# Patient Record
Sex: FEMALE | URBAN - METROPOLITAN AREA
[De-identification: names, ages, dates, MRNs, and addresses within clinical notes are randomized per-mention and may not be internally consistent; named-entity substitution may affect disease eponyms.]

---

## 2018-07-24 ENCOUNTER — OFFICE VISIT (OUTPATIENT)
Dept: URBAN - METROPOLITAN AREA CLINIC 88 | Facility: CLINIC | Age: 68
End: 2018-07-24
Payer: MEDICARE

## 2018-07-24 DIAGNOSIS — H25.813 COMBINED FORMS OF AGE-RELATED CATARACT, BILATERAL: ICD-10-CM

## 2018-07-24 DIAGNOSIS — H53.2 DIPLOPIA: ICD-10-CM

## 2018-07-24 DIAGNOSIS — E11.9 TYPE 2 DIABETES MELLITUS WITHOUT COMPLICATIONS: Primary | ICD-10-CM

## 2018-07-24 DIAGNOSIS — H40.1131 PRIMARY OPEN-ANGLE GLAUCOMA, BILATERAL, MILD STAGE: ICD-10-CM

## 2018-07-24 DIAGNOSIS — H50.10 EXOTROPIA: ICD-10-CM

## 2018-07-24 PROCEDURE — 92004 COMPRE OPH EXAM NEW PT 1/>: CPT | Performed by: OPHTHALMOLOGY

## 2018-07-24 RX ORDER — PREDNISOLONE ACETATE 10 MG/ML
1 % SUSPENSION/ DROPS OPHTHALMIC
Qty: 1 | Refills: 1 | Status: INACTIVE
Start: 2018-07-24 | End: 2018-10-18

## 2018-07-24 RX ORDER — LATANOPROST 50 UG/ML
0.005 % SOLUTION OPHTHALMIC
Qty: 0 | Refills: 0 | Status: INACTIVE
Start: 2018-07-24 | End: 2018-10-18

## 2018-07-24 RX ORDER — GATIFLOXACIN 5 MG/ML
0.5 % SOLUTION/ DROPS OPHTHALMIC
Qty: 1 | Refills: 0 | Status: INACTIVE
Start: 2018-07-24 | End: 2018-10-18

## 2018-07-24 RX ORDER — KETOROLAC TROMETHAMINE 5 MG/ML
0.5 % SOLUTION/ DROPS OPHTHALMIC
Qty: 1 | Refills: 1 | Status: INACTIVE
Start: 2018-07-24 | End: 2018-10-18

## 2018-07-24 ASSESSMENT — INTRAOCULAR PRESSURE
OD: 14
OS: 14

## 2018-07-24 ASSESSMENT — KERATOMETRY
OS: 42.75
OD: 42.50

## 2018-07-24 ASSESSMENT — VISUAL ACUITY
OD: 20/50
OS: 20/50

## 2018-07-24 NOTE — IMPRESSION/PLAN
Impression: Diplopia: H53.2. Plan: I explained to the patient that if Diplopia becomes constant she may need glasses.  Recommend Cataract sx first.

## 2018-07-24 NOTE — IMPRESSION/PLAN
Impression: Type 2 diabetes mellitus without complications: N66.8. Plan: Diabetes type II: no background retinopathy, no signs of neovascularization noted. Discussed ocular and systemic benefits of blood sugar control.

## 2018-07-24 NOTE — IMPRESSION/PLAN
Impression: Combined forms of age-related cataract, bilateral: H25.813. Plan: Cataracts account for the patient's complaints. Discussed all risks, benefits, procedures and recovery. Patient understands changing glasses will not improve vision. Patient desires to have surgery, recommend phacoemulsification with intraocular lens. Surgical risks and benefits were discussed, explained and to patient. Schedule Cataract sx OD first/then OS.  RL2  target: distance

## 2018-07-24 NOTE — IMPRESSION/PLAN
Impression: Exotropia: H50.10. Plan: Explained to the patient she may need to see [facility] in the future.

## 2018-08-16 ENCOUNTER — Encounter (OUTPATIENT)
Dept: URBAN - METROPOLITAN AREA CLINIC 88 | Facility: CLINIC | Age: 68
End: 2018-08-16
Payer: MEDICARE

## 2018-08-17 ENCOUNTER — POST-OPERATIVE VISIT (OUTPATIENT)
Dept: URBAN - METROPOLITAN AREA CLINIC 88 | Facility: CLINIC | Age: 68
End: 2018-08-17

## 2018-08-17 PROCEDURE — 99024 POSTOP FOLLOW-UP VISIT: CPT | Performed by: OPTOMETRIST

## 2018-08-17 RX ORDER — BRIMONIDINE TARTRATE 2 MG/ML
0.2 % SOLUTION/ DROPS OPHTHALMIC
Qty: 1 | Refills: 0 | Status: INACTIVE
Start: 2018-08-17 | End: 2018-10-18

## 2018-08-17 ASSESSMENT — INTRAOCULAR PRESSURE: OD: 18

## 2018-08-24 ENCOUNTER — POST-OPERATIVE VISIT (OUTPATIENT)
Dept: URBAN - METROPOLITAN AREA CLINIC 88 | Facility: CLINIC | Age: 68
End: 2018-08-24

## 2018-08-24 PROCEDURE — 99024 POSTOP FOLLOW-UP VISIT: CPT | Performed by: OPTOMETRIST

## 2018-08-24 ASSESSMENT — INTRAOCULAR PRESSURE
OD: 17
OS: 17

## 2018-08-24 ASSESSMENT — VISUAL ACUITY: OD: 20/25

## 2018-09-14 ENCOUNTER — Encounter (OUTPATIENT)
Dept: URBAN - METROPOLITAN AREA EXTERNAL CLINIC 48 | Facility: EXTERNAL CLINIC | Age: 68
End: 2018-09-14
Payer: MEDICARE

## 2018-09-14 ENCOUNTER — POST-OPERATIVE VISIT (OUTPATIENT)
Dept: URBAN - METROPOLITAN AREA CLINIC 88 | Facility: CLINIC | Age: 68
End: 2018-09-14

## 2018-09-14 DIAGNOSIS — Z09 ENCNTR FOR F/U EXAM AFT TRTMT FOR COND OTH THAN MALIG NEOPLM: Primary | ICD-10-CM

## 2018-09-14 PROCEDURE — 99024 POSTOP FOLLOW-UP VISIT: CPT | Performed by: OPHTHALMOLOGY

## 2018-09-14 PROCEDURE — 66984 XCAPSL CTRC RMVL W/O ECP: CPT | Performed by: OPHTHALMOLOGY

## 2018-09-14 ASSESSMENT — INTRAOCULAR PRESSURE
OS: 32
OS: 32

## 2018-09-20 ENCOUNTER — POST-OPERATIVE VISIT (OUTPATIENT)
Dept: URBAN - METROPOLITAN AREA CLINIC 88 | Facility: CLINIC | Age: 68
End: 2018-09-20

## 2018-09-20 PROCEDURE — 99024 POSTOP FOLLOW-UP VISIT: CPT | Performed by: OPTOMETRIST

## 2018-09-20 ASSESSMENT — VISUAL ACUITY
OS: 20/40
OD: 20/30

## 2018-09-20 ASSESSMENT — INTRAOCULAR PRESSURE
OD: 14
OS: 14

## 2018-10-18 ENCOUNTER — POST-OPERATIVE VISIT (OUTPATIENT)
Dept: URBAN - METROPOLITAN AREA CLINIC 88 | Facility: CLINIC | Age: 68
End: 2018-10-18

## 2018-10-18 PROCEDURE — 99024 POSTOP FOLLOW-UP VISIT: CPT | Performed by: OPTOMETRIST

## 2018-10-18 RX ORDER — LATANOPROST 50 UG/ML
0.005 % SOLUTION OPHTHALMIC
Qty: 3 | Refills: 3 | Status: INACTIVE
Start: 2018-10-18 | End: 2019-02-12

## 2018-10-18 ASSESSMENT — INTRAOCULAR PRESSURE
OD: 14
OS: 16

## 2018-10-18 ASSESSMENT — VISUAL ACUITY
OS: 20/60
OD: 20/50

## 2018-11-08 ENCOUNTER — OFFICE VISIT (OUTPATIENT)
Dept: URBAN - METROPOLITAN AREA CLINIC 88 | Facility: CLINIC | Age: 68
End: 2018-11-08
Payer: MEDICARE

## 2018-11-08 DIAGNOSIS — H40.1133 PRIMARY OPEN-ANGLE GLAUCOMA, BILATERAL, SEVERE STAGE: Primary | ICD-10-CM

## 2018-11-08 DIAGNOSIS — Z96.1 PRESENCE OF PSEUDOPHAKIA: ICD-10-CM

## 2018-11-08 PROCEDURE — 99213 OFFICE O/P EST LOW 20 MIN: CPT | Performed by: OPTOMETRIST

## 2018-11-08 ASSESSMENT — INTRAOCULAR PRESSURE
OS: 16
OS: 15
OD: 15

## 2018-11-08 NOTE — IMPRESSION/PLAN
Impression: Primary open-angle glaucoma, bilateral, severe stage: R96.8158. Plan: Discussed status of examination with patient. Treatment was started by Dr. Charyl Leyden. Pt wishes to continue care at [facility]. Continue Latanoprost QHS OU. OCT ON today, thin inferior OU average thickness 68 OD, 64 OS. VF 24-2 today superior arcuate paracentral loss, severe stage OU. IOP today 15 OU, will consider additional medications. Will set target IOP after receiving medical records from Dr. Charyl Leyden. Patient understands risk associated with condition and need for monitoring. Patient traveling to [facility], knows to seek care if vision changes experienced. RTC 3-4 months for IOP check and dilation when pt returns from [facility].